# Patient Record
Sex: MALE | Race: BLACK OR AFRICAN AMERICAN | Employment: UNEMPLOYED | URBAN - METROPOLITAN AREA
[De-identification: names, ages, dates, MRNs, and addresses within clinical notes are randomized per-mention and may not be internally consistent; named-entity substitution may affect disease eponyms.]

---

## 2020-04-16 ENCOUNTER — HOSPITAL ENCOUNTER (EMERGENCY)
Facility: CLINIC | Age: 40
Discharge: HOME | End: 2020-04-16
Attending: EMERGENCY MEDICINE | Admitting: EMERGENCY MEDICINE
Payer: COMMERCIAL

## 2020-04-16 VITALS
DIASTOLIC BLOOD PRESSURE: 92 MMHG | OXYGEN SATURATION: 97 % | RESPIRATION RATE: 18 BRPM | SYSTOLIC BLOOD PRESSURE: 138 MMHG | TEMPERATURE: 98.7 F | WEIGHT: 192 LBS

## 2020-04-16 DIAGNOSIS — J02.9 SORE THROAT: ICD-10-CM

## 2020-04-16 PROCEDURE — 99283 EMERGENCY DEPT VISIT LOW MDM: CPT | Performed by: EMERGENCY MEDICINE

## 2020-04-16 PROCEDURE — 99283 EMERGENCY DEPT VISIT LOW MDM: CPT | Mod: Z6 | Performed by: EMERGENCY MEDICINE

## 2020-04-16 PROCEDURE — 25000132 ZZH RX MED GY IP 250 OP 250 PS 637: Performed by: EMERGENCY MEDICINE

## 2020-04-16 RX ORDER — IBUPROFEN 600 MG/1
600 TABLET, FILM COATED ORAL ONCE
Status: COMPLETED | OUTPATIENT
Start: 2020-04-16 | End: 2020-04-16

## 2020-04-16 RX ADMIN — IBUPROFEN 600 MG: 600 TABLET ORAL at 18:15

## 2020-04-16 NOTE — DISCHARGE INSTRUCTIONS
Your throat is slightly larger and swollen on the left than the right.  This could be an early peritonsillar abscess but likely small at this time given the uvula is still down the center of your throat.  If this gets worse and you develop a fever, difficulty opening your mouth, uvula pushed over to the right please return to the emergency department for further evaluation.  At that time we may need to do imaging or drain the potential abscess.  I hope you get better quickly.  Take the antibiotics as directed.

## 2020-04-16 NOTE — ED AVS SNAPSHOT
[facility] Emergency Department  [address] [place] [address]  Phone:  [phone]  Fax:  [phone]                                    Nikolas Martínez   MRN: [id number]    Department:  [facility] Emergency Department   Date of Visit:  4/16/2020           After Visit Summary Signature Page    I have received my discharge instructions, and my questions have been answered. I have discussed any challenges I see with this plan with the nurse or doctor.    ..........................................................................................................................................  Patient/Patient Representative Signature      ..........................................................................................................................................  Patient Representative Print Name and Relationship to Patient    ..................................................               ................................................  Date                                   Time    ..........................................................................................................................................  Reviewed by Signature/Title    ...................................................              ..............................................  Date                                               Time          22EPIC Rev 08/18

## 2020-04-16 NOTE — ED PROVIDER NOTES
History     Chief Complaint   Patient presents with     Dysphagia     HPI  Nikolas Martínez is a 39 year old male who who presents to the emergency department from [facility] for sore throat that started yesterday.  It hurts to swallow.  He has pain more so on the left side of his throat than the right.  He has not had fever, swollen lymph nodes, headache, congestion, cough, abdominal pain or other symptoms.  He has had this once previously and it was strep throat.  He was seen by medical provider at the retirement who swabbed his throat and said it was negative but was concerned for possible left peritonsillar abscess.    Allergies:  No Known Allergies    Problem List:    There are no active problems to display for this patient.       Past Medical History:    History reviewed. No pertinent past medical history.    Past Surgical History:    History reviewed. No pertinent surgical history.    Family History:    History reviewed. No pertinent family history.    Social History:  Marital Status:  Single [1]  Social History     Tobacco Use     Smoking status: Current Every Day Smoker     Packs/day: 0.50     Types: Cigarettes     Smokeless tobacco: Never Used   Substance Use Topics     Alcohol use: None     Drug use: None        Medications:    amoxicillin-clavulanate (AUGMENTIN) 875-125 MG tablet          Review of Systems   All other systems reviewed and are negative.      Physical Exam   BP: (!) 138/92  Heart Rate: 91  Temp: 98.7  F (37.1  C)  Resp: 18  Weight: 87.1 kg (192 lb)  SpO2: 97 %      Physical Exam  Constitutional:       General: He is not in acute distress.     Appearance: He is well-developed. He is not diaphoretic.   HENT:      Head: Normocephalic and atraumatic.      Right Ear: Tympanic membrane normal.      Left Ear: Tympanic membrane normal.      Nose: Nose normal.      Mouth/Throat:      Mouth: Mucous membranes are moist.      Pharynx: Posterior oropharyngeal erythema present. No oropharyngeal exudate.       Comments: Slightly larger left peritonsillar area with mild diffuse oral pharyngeal erythema and no exudate.  Uvula is midline.  Eyes:      General: No scleral icterus.     Extraocular Movements: Extraocular movements intact.      Conjunctiva/sclera: Conjunctivae normal.   Neck:      Musculoskeletal: Normal range of motion and neck supple.   Cardiovascular:      Rate and Rhythm: Normal rate and regular rhythm.   Skin:     General: Skin is warm and dry.      Findings: No rash.   Neurological:      Mental Status: He is alert and oriented to person, place, and time.         ED Course        Procedures                   No results found for this or any previous visit (from the past 24 hour(s)).    Medications   ibuprofen (ADVIL/MOTRIN) tablet 600 mg (has no administration in time range)       Assessments & Plan (with Medical Decision Making)  Pharyngitis.  Negative strep at the [facility].  His uvula is still midline and although the left side is slightly larger than the right it is not bulging or terribly large.  At this point I think treatment with antibiotics is indicated as this may stave off any development of a peritonsillar abscess.  If there is an abscess there now it is likely too small to be drained.  I gave them specific return precautions including an enlarging left-sided peritonsillar area, deviation of the uvula from midline to right, fevers, trismus, increased difficulty with swallowing or drooling.       I have reviewed the nursing notes.    I have reviewed the findings, diagnosis, plan and need for follow up with the patient.      New Prescriptions    AMOXICILLIN-CLAVULANATE (AUGMENTIN) 875-125 MG TABLET    Take 1 tablet by mouth 2 times daily       Final diagnoses:   Sore throat       4/16/2020   [facility] EMERGENCY DEPARTMENT     Calin Corona MD  04/16/20 [id number]

## 2020-04-16 NOTE — ED TRIAGE NOTES
Pt brought in for trouble swallowing. Pt has had this before and normally has a sore throat with it, this time just hurts to swallow, not sore otherwise.